# Patient Record
Sex: MALE
[De-identification: names, ages, dates, MRNs, and addresses within clinical notes are randomized per-mention and may not be internally consistent; named-entity substitution may affect disease eponyms.]

---

## 2019-03-05 PROBLEM — Z00.00 ENCOUNTER FOR PREVENTIVE HEALTH EXAMINATION: Status: ACTIVE | Noted: 2019-03-05

## 2019-03-06 ENCOUNTER — APPOINTMENT (OUTPATIENT)
Dept: OTOLARYNGOLOGY | Facility: CLINIC | Age: 54
End: 2019-03-06

## 2019-03-12 ENCOUNTER — APPOINTMENT (OUTPATIENT)
Dept: OTOLARYNGOLOGY | Facility: CLINIC | Age: 54
End: 2019-03-12

## 2019-03-14 ENCOUNTER — APPOINTMENT (OUTPATIENT)
Dept: OTOLARYNGOLOGY | Facility: CLINIC | Age: 54
End: 2019-03-14
Payer: SELF-PAY

## 2019-03-14 ENCOUNTER — TRANSCRIPTION ENCOUNTER (OUTPATIENT)
Age: 54
End: 2019-03-14

## 2019-03-14 PROCEDURE — V5299A: CUSTOM | Mod: NC

## 2019-03-14 PROCEDURE — 92557 COMPREHENSIVE HEARING TEST: CPT

## 2019-03-14 PROCEDURE — 92567 TYMPANOMETRY: CPT

## 2019-03-19 ENCOUNTER — APPOINTMENT (OUTPATIENT)
Dept: OTOLARYNGOLOGY | Facility: CLINIC | Age: 54
End: 2019-03-19
Payer: SELF-PAY

## 2019-03-19 PROCEDURE — V5299A: CUSTOM | Mod: NC

## 2019-04-22 ENCOUNTER — APPOINTMENT (OUTPATIENT)
Dept: OTOLARYNGOLOGY | Facility: CLINIC | Age: 54
End: 2019-04-22
Payer: COMMERCIAL

## 2019-04-22 VITALS
DIASTOLIC BLOOD PRESSURE: 78 MMHG | WEIGHT: 166 LBS | HEIGHT: 68 IN | BODY MASS INDEX: 25.16 KG/M2 | HEART RATE: 70 BPM | SYSTOLIC BLOOD PRESSURE: 131 MMHG

## 2019-04-22 DIAGNOSIS — Z91.09 OTHER ALLERGY STATUS, OTHER THAN TO DRUGS AND BIOLOGICAL SUBSTANCES: ICD-10-CM

## 2019-04-22 DIAGNOSIS — Z83.3 FAMILY HISTORY OF DIABETES MELLITUS: ICD-10-CM

## 2019-04-22 DIAGNOSIS — Z80.3 FAMILY HISTORY OF MALIGNANT NEOPLASM OF BREAST: ICD-10-CM

## 2019-04-22 DIAGNOSIS — H66.011 ACUTE SUPPURATIVE OTITIS MEDIA WITH SPONTANEOUS RUPTURE OF EAR DRUM, RIGHT EAR: ICD-10-CM

## 2019-04-22 DIAGNOSIS — Z78.9 OTHER SPECIFIED HEALTH STATUS: ICD-10-CM

## 2019-04-22 PROCEDURE — 99213 OFFICE O/P EST LOW 20 MIN: CPT | Mod: 25

## 2019-04-22 PROCEDURE — 69210 REMOVE IMPACTED EAR WAX UNI: CPT

## 2019-04-22 NOTE — HISTORY OF PRESENT ILLNESS
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 There is a history of tinnitus for many years since the 1990s. Previously treated with steroids with uncertain benefit. Intratympanic steroids appeared to have been effective.\par \par MRI 2000; normal MRI 2007: Normal\par CT temporal bone 2017: Normal\par \par RPR serologies in HSP 70 serologies 2017: Negative\par \par Right intratympanic steroid injections: March 2018: April 2018: June 2018: July 2018: October 2018: November 2018: December 2018;\par Treated with Humira\par \par July 2018: Right myringotomy with tube insertion\par \par Developed right otorrhea following air travel 5 days ago. Mild pain. Decline in hearing reported.

## 2019-04-22 NOTE — ASSESSMENT
[FreeTextEntry1] : Acute infection of the right ear was extruded ventilation tube and tympanic membrane perforation. Oral and topical antibiotics recommended. Hearing aid holiday recommended. Followup recommended.

## 2019-04-22 NOTE — REASON FOR VISIT
[Subsequent Evaluation] : a subsequent evaluation for [Hearing Loss] : hearing loss [Ear Drainage] : ear drainage

## 2019-04-22 NOTE — PHYSICAL EXAM
[FreeTextEntry1] : Procedure: Microscopic Ear Exam\par \par Left ear:  \par Ear canal intact without inflammation or lesion.  \par Tympanic membrane intact without inflammation.\par \par \par Right ear:  \par Obstructing cerumen and purulent debris removed with suction. The ventilation tube was previously extruded surrounded by purulent debris; and removed with alligator forceps. Inferior tympanic membrane perforation, 15% with mucoid discharge emanating. This was debrided gently with suction. Treated with topical CSF powder [Normal] : mucosa is normal [Midline] : trachea located in midline position

## 2019-05-06 ENCOUNTER — APPOINTMENT (OUTPATIENT)
Dept: OTOLARYNGOLOGY | Facility: CLINIC | Age: 54
End: 2019-05-06
Payer: COMMERCIAL

## 2019-05-06 VITALS
BODY MASS INDEX: 25.16 KG/M2 | DIASTOLIC BLOOD PRESSURE: 75 MMHG | WEIGHT: 166 LBS | HEART RATE: 71 BPM | HEIGHT: 68 IN | SYSTOLIC BLOOD PRESSURE: 122 MMHG

## 2019-05-06 PROCEDURE — 99214 OFFICE O/P EST MOD 30 MIN: CPT | Mod: 25

## 2019-05-06 PROCEDURE — 92504 EAR MICROSCOPY EXAMINATION: CPT

## 2019-05-06 RX ORDER — AMOXICILLIN AND CLAVULANATE POTASSIUM 875; 125 MG/1; MG/1
875-125 TABLET, COATED ORAL
Qty: 14 | Refills: 0 | Status: COMPLETED | COMMUNITY
Start: 2019-04-22 | End: 2019-05-06

## 2019-05-06 RX ORDER — CIPROFLOXACIN AND DEXAMETHASONE 3; 1 MG/ML; MG/ML
0.3-0.1 SUSPENSION/ DROPS AURICULAR (OTIC) TWICE DAILY
Qty: 1 | Refills: 0 | Status: DISCONTINUED | COMMUNITY
Start: 2019-04-22 | End: 2019-05-06

## 2019-05-06 NOTE — REVIEW OF SYSTEMS
I called pt back and reviewed labs and scheduled for f/up appointment at [facility] in November.  Eduin Yang RN [Patient Intake Form Reviewed] : Patient intake form was reviewed

## 2019-05-06 NOTE — HISTORY OF PRESENT ILLNESS
[FreeTextEntry1] : 05/06/2019 better symptoms.  Minor drainage remains in the right ear.  No pain.  Hearing is better but not back to baseline. Completed ora and topical antibiotics. 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 There is a history of tinnitus for many years since the 1990s. Previously treated with steroids with uncertain benefit. Intratympanic steroids appeared to have been effective.\par \par MRI 2000; normal MRI 2007: Normal\par CT temporal bone 2017: Normal\par \par RPR serologies in HSP 70 serologies 2017: Negative\par \par Right intratympanic steroid injections: March 2018: April 2018: June 2018: July 2018: October 2018: November 2018: December 2018;\par Treated with Humira\par \par July 2018: Right myringotomy with tube insertion\par

## 2019-05-06 NOTE — CONSULT LETTER
[Please see my note below.] : Please see my note below. [FreeTextEntry1] : Thank you for allowing me to participate in the care of MANOLO BUITRAGO .\par Please see the attached visit note.\par \par \par \par Terence Hurt\par Otology\par Department of Otolaryngology\par [facility]  [FreeTextEntry2] : Dear Dr. Kevan Driscoll

## 2019-05-06 NOTE — ASSESSMENT
[FreeTextEntry1] : Continued inflammation of the right ear canal. A culture was taken. Suspected fungal infection. Treated today with topical gentian violet and CSF antifungal powder. I have provided a prescription for antifungal powder and recommended followup in one week.\par \par After discussion with Dr. Driscoll, he was advised him to the next dose of Humira until this infection is clear. He will be seen in one week.

## 2019-05-06 NOTE — PHYSICAL EXAM
[Normal] : mucosa is normal [Midline] : trachea located in midline position [FreeTextEntry1] : Procedure: Microscopic Ear Exam\par \par Left ear:  \par Ear canal intact without inflammation or lesion.  \par Tympanic membrane intact without inflammation.\par \par \par Right ear:  \par Purulent and wet newspaper debris in the ear canal debrided with suction. A culture was taken prior to debridement. The tympanic membrane has a central perforation with no apparent inflammation associated. The middle ear mucosa appears to be uninvolved.\par \par This ear was treated with topical gentian violet and CSF powder

## 2019-05-08 ENCOUNTER — APPOINTMENT (OUTPATIENT)
Dept: OTOLARYNGOLOGY | Facility: CLINIC | Age: 54
End: 2019-05-08
Payer: COMMERCIAL

## 2019-05-08 PROCEDURE — V5299A: CUSTOM | Mod: NC

## 2019-05-09 ENCOUNTER — APPOINTMENT (OUTPATIENT)
Dept: ULTRASOUND IMAGING | Facility: HOSPITAL | Age: 54
End: 2019-05-09
Payer: COMMERCIAL

## 2019-05-09 ENCOUNTER — OUTPATIENT (OUTPATIENT)
Dept: OUTPATIENT SERVICES | Facility: HOSPITAL | Age: 54
LOS: 1 days | End: 2019-05-09
Payer: COMMERCIAL

## 2019-05-09 PROCEDURE — 76870 US EXAM SCROTUM: CPT

## 2019-05-09 PROCEDURE — 76870 US EXAM SCROTUM: CPT | Mod: 26

## 2019-05-10 ENCOUNTER — APPOINTMENT (OUTPATIENT)
Dept: OTOLARYNGOLOGY | Facility: CLINIC | Age: 54
End: 2019-05-10

## 2019-05-13 ENCOUNTER — APPOINTMENT (OUTPATIENT)
Dept: OTOLARYNGOLOGY | Facility: CLINIC | Age: 54
End: 2019-05-13
Payer: COMMERCIAL

## 2019-05-13 VITALS
BODY MASS INDEX: 25.16 KG/M2 | HEIGHT: 68 IN | DIASTOLIC BLOOD PRESSURE: 81 MMHG | SYSTOLIC BLOOD PRESSURE: 119 MMHG | WEIGHT: 166 LBS | HEART RATE: 62 BPM

## 2019-05-13 DIAGNOSIS — B36.9 SUPERFICIAL MYCOSIS, UNSPECIFIED: ICD-10-CM

## 2019-05-13 DIAGNOSIS — H62.41 SUPERFICIAL MYCOSIS, UNSPECIFIED: ICD-10-CM

## 2019-05-13 PROCEDURE — 92504 EAR MICROSCOPY EXAMINATION: CPT

## 2019-05-13 PROCEDURE — 99213 OFFICE O/P EST LOW 20 MIN: CPT | Mod: 25

## 2019-05-13 NOTE — PHYSICAL EXAM
[FreeTextEntry1] : Procedure: Microscopic Ear Exam\par \par Left ear:  \par Ear canal intact without inflammation or lesion.  \par Tympanic membrane intact without inflammation.\par \par \par Right ear:  \par Ear canal dry. Retained otic powder on the ear canal and tympanic membrane. No inflammation. A small tympanic membrane perforation patient remains inferiorly. 10-15% [Normal] : mucosa is normal [Midline] : trachea located in midline position

## 2019-05-13 NOTE — HISTORY OF PRESENT ILLNESS
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 There is a history of tinnitus for many years since the 1990s. Previously treated with steroids with uncertain benefit. Intratympanic steroids appeared to have been effective.\par \par MRI 2000; normal MRI 2007: Normal\par CT temporal bone 2017: Normal\par \par RPR serologies in HSP 70 serologies 2017: Negative\par \par Right intratympanic steroid injections: March 2018: April 2018: June 2018: July 2018: October 2018: November 2018: December 2018;\par Treated with Humira\par \par July 2018: Right myringotomy with tube insertion\par  [FreeTextEntry1] : 05/13/2019 dizzy and nausea yesterday after exertion, now better. No ear pain or otorrhea.  Used rx powder in the ear.  Hearing seems to be improved.Discontinued the use of Humira during the acute infection has per rheumatologist.

## 2019-05-13 NOTE — CONSULT LETTER
[FreeTextEntry2] : Dear Kevan Driscoll [FreeTextEntry1] : Thank you for allowing me to participate in the care of MANOLO BUITRAGO .\par Please see the attached visit note.\par \par \par \par Terence Hurt\par Otology\par Department of Otolaryngology\par [facility]

## 2019-05-13 NOTE — ASSESSMENT
[FreeTextEntry1] : Significantly improved infection of the right ear presumably due to fungus. Fungal culture remains pending.\par \par Small perforation remained to the tympanic membrane. I have recommended dry ear precautions and reduced hearing aide usage. This may require time off from work.\par \par Followup recommended in 4 weeks.May wish to return to prior dosing of Humira in approximately 2 weeks.

## 2019-05-14 LAB — BACTERIA SPEC CULT: NORMAL

## 2019-06-05 LAB — FUNGUS SPEC CULT ORG #8: NORMAL

## 2019-06-10 ENCOUNTER — APPOINTMENT (OUTPATIENT)
Dept: OTOLARYNGOLOGY | Facility: CLINIC | Age: 54
End: 2019-06-10
Payer: COMMERCIAL

## 2019-06-10 VITALS
WEIGHT: 166 LBS | BODY MASS INDEX: 25.16 KG/M2 | DIASTOLIC BLOOD PRESSURE: 78 MMHG | HEIGHT: 68 IN | HEART RATE: 79 BPM | SYSTOLIC BLOOD PRESSURE: 119 MMHG

## 2019-06-10 PROCEDURE — 92504 EAR MICROSCOPY EXAMINATION: CPT

## 2019-06-10 PROCEDURE — 99213 OFFICE O/P EST LOW 20 MIN: CPT

## 2019-06-10 RX ORDER — VORICONAZOLE 50 MG/1
50 TABLET ORAL
Qty: 5 | Refills: 0 | Status: DISCONTINUED | COMMUNITY
Start: 2019-05-06 | End: 2019-06-10

## 2019-06-10 NOTE — HISTORY OF PRESENT ILLNESS
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 There is a history of tinnitus for many years since the 1990s. Previously treated with steroids with uncertain benefit. Intratympanic steroids appeared to have been effective.\par \par MRI 2000; normal MRI 2007: Normal\par CT temporal bone 2017: Normal\par \par RPR serologies in HSP 70 serologies 2017: Negative\par \par Right intratympanic steroid injections: March 2018: April 2018: June 2018: July 2018: October 2018: November 2018: December 2018;\par Treated with Humira\par \par July 2018: Right myringotomy with tube insertion\par  [FreeTextEntry1] : 06/10/2019 Follow up ear infection (presumed to be fungal) and immune ear disease.  no ear pain or otorrhea.  Back on Humira.  No significant change in hearing.

## 2019-06-10 NOTE — ASSESSMENT
[FreeTextEntry1] : Complete resolution of ear canal inflammation with persistent tympanic membrane perforation present. Water protection recommended. Repeat audiometry recommended.\par \par We discussed management options including patch myringoplasty versus formal tympanoplasty versus observation. This was reviewed in detail. He appears to favor a patch procedure.

## 2019-06-10 NOTE — PHYSICAL EXAM
[FreeTextEntry1] : Procedure: Microscopic Ear Exam\par \par Left ear:  \par Ear canal intact without inflammation or lesion.  \par Tympanic membrane intact without inflammation.\par \par \par Right ear:  \par Mild residual gentian violet in the ear canal. No inflammation. Central tympanic membrane perforation 15%. No inflammation of the tympanic membrane or middle ear mucosa. [Normal] : palatine tonsils are normal [Midline] : trachea located in midline position

## 2019-06-21 ENCOUNTER — APPOINTMENT (OUTPATIENT)
Dept: OTOLARYNGOLOGY | Facility: CLINIC | Age: 54
End: 2019-06-21
Payer: COMMERCIAL

## 2019-06-21 PROCEDURE — 92557 COMPREHENSIVE HEARING TEST: CPT

## 2019-06-21 PROCEDURE — 92567 TYMPANOMETRY: CPT

## 2019-06-21 PROCEDURE — 92593: CPT | Mod: NC

## 2019-07-01 ENCOUNTER — APPOINTMENT (OUTPATIENT)
Dept: OTOLARYNGOLOGY | Facility: CLINIC | Age: 54
End: 2019-07-01
Payer: COMMERCIAL

## 2019-07-01 VITALS
HEIGHT: 68 IN | WEIGHT: 166 LBS | BODY MASS INDEX: 25.16 KG/M2 | DIASTOLIC BLOOD PRESSURE: 71 MMHG | SYSTOLIC BLOOD PRESSURE: 126 MMHG | HEART RATE: 70 BPM

## 2019-07-01 PROCEDURE — 99213 OFFICE O/P EST LOW 20 MIN: CPT | Mod: 25

## 2019-07-01 PROCEDURE — 92504 EAR MICROSCOPY EXAMINATION: CPT

## 2019-07-01 PROCEDURE — 69620 MYRINGOPLASTY: CPT

## 2019-07-01 NOTE — CONSULT LETTER
[Please see my note below.] : Please see my note below. [FreeTextEntry2] : Dear Kevan Driscoll [FreeTextEntry1] : Thank you for allowing me to participate in the care of your patient.\par Please see the attached visit note.\par \par \par \par Terence Hurt\par Otology\par Department of Otolaryngology\par [facility]\par

## 2019-07-01 NOTE — ASSESSMENT
[FreeTextEntry1] : Persistent tympanic membrane perforation in the right ear with hearing loss consequence. I have carefully reviewed the management options in detail including all risks limitations complications and alternatives. He wished to proceed with a patch procedure today.\par \par \par Significant improvement in hearing reported after placement of a patch repair on the right tympanic membrane. Wound instructions reviewed. Followup recommended in one month.

## 2019-07-01 NOTE — PROCEDURE
[FreeTextEntry3] : Procedure: Right Paper patch myringoplasty \par Management options reviewed in detail.  All risks limitations complications and alternatives reviewed with the patient who agreed.\par \par Anesthesia:topical phenol\par Complications: None\par Estimated Blood Loss: None\par Findings: central perforation, rimmed\par

## 2019-07-01 NOTE — PHYSICAL EXAM
[Normal] : mucosa is normal [Midline] : trachea located in midline position [FreeTextEntry1] : Procedure: Microscopic Ear Exam\par \par Left ear:  \par Ear canal intact without inflammation or lesion.  \par Tympanic membrane intact without inflammation.\par \par \par Right ear:  \par Mild residual gentian violet in the ear canal. No inflammation. Central tympanic membrane perforation 20%. No inflammation of the tympanic membrane or middle ear mucosa.

## 2019-07-01 NOTE — HISTORY OF PRESENT ILLNESS
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 There is a history of tinnitus for many years since the 1990s. Previously treated with steroids with uncertain benefit. Intratympanic steroids appeared to have been effective.\par \par MRI 2000; normal MRI 2007: Normal\par CT temporal bone 2017: Normal\par \par RPR serologies in HSP 70 serologies 2017: Negative\par \par Right intratympanic steroid injections: March 2018: April 2018: June 2018: July 2018: October 2018: November 2018: December 2018;\par Treated with Humira\par \par July 2018: Right myringotomy with tube insertion\par \par 06/10/2019 Follow up ear infection (presumed to be fungal) and immune ear disease.  no ear pain or otorrhea.  Back on Humira.  No significant change in hearing. [FreeTextEntry1] : 07/01/2019 Routine Follow up for the ear drainage. Wanted to talk about proceeding with the patch myringoplasty. No pain and otorrhea reported

## 2019-07-03 ENCOUNTER — APPOINTMENT (OUTPATIENT)
Dept: OTOLARYNGOLOGY | Facility: CLINIC | Age: 54
End: 2019-07-03
Payer: COMMERCIAL

## 2019-07-03 PROCEDURE — 92593: CPT | Mod: NC

## 2019-08-06 ENCOUNTER — APPOINTMENT (OUTPATIENT)
Dept: OTOLARYNGOLOGY | Facility: CLINIC | Age: 54
End: 2019-08-06
Payer: COMMERCIAL

## 2019-08-06 VITALS
DIASTOLIC BLOOD PRESSURE: 70 MMHG | SYSTOLIC BLOOD PRESSURE: 114 MMHG | HEART RATE: 59 BPM | HEIGHT: 68 IN | BODY MASS INDEX: 25.16 KG/M2 | WEIGHT: 166 LBS

## 2019-08-06 PROCEDURE — 69210 REMOVE IMPACTED EAR WAX UNI: CPT | Mod: 79

## 2019-08-06 PROCEDURE — 99213 OFFICE O/P EST LOW 20 MIN: CPT | Mod: 25

## 2019-09-10 ENCOUNTER — APPOINTMENT (OUTPATIENT)
Dept: OTOLARYNGOLOGY | Facility: CLINIC | Age: 54
End: 2019-09-10
Payer: COMMERCIAL

## 2019-09-10 VITALS
SYSTOLIC BLOOD PRESSURE: 111 MMHG | WEIGHT: 166 LBS | DIASTOLIC BLOOD PRESSURE: 71 MMHG | HEART RATE: 68 BPM | BODY MASS INDEX: 25.16 KG/M2 | HEIGHT: 68 IN

## 2019-09-10 DIAGNOSIS — H72.91 UNSPECIFIED PERFORATION OF TYMPANIC MEMBRANE, RIGHT EAR: ICD-10-CM

## 2019-09-10 PROCEDURE — 99213 OFFICE O/P EST LOW 20 MIN: CPT | Mod: 25

## 2019-09-10 PROCEDURE — 92504 EAR MICROSCOPY EXAMINATION: CPT

## 2019-09-10 NOTE — PHYSICAL EXAM
[Normal] : mucosa is normal [Midline] : trachea located in midline position [FreeTextEntry1] : Procedure: Microscopic Ear Exam with cerumen debridement\par \par Left ear:  \par Ear canal intact without inflammation or lesion.  \par Tympanic membrane intact without inflammation.\par \par \par Right ear:  \par Obstructing cerumen debrided with alligator forceps.  A Patch remains on the posterior portion of the tympanic membrane. It is slightly lifted with a intact portion of the tympanic membrane noted beneath. No inflammation or middle ear process detected.

## 2019-09-10 NOTE — CONSULT LETTER
[Please see my note below.] : Please see my note below. [FreeTextEntry1] : Thank you for allowing me to participate in the care of your patient.\par Please see the attached visit note.\par \par \par \par Terence Hurt\par Otology\par Department of Otolaryngology\par [facility]\par  [FreeTextEntry2] : Dear Dr Kevan Driscoll

## 2019-09-10 NOTE — HISTORY OF PRESENT ILLNESS
[FreeTextEntry1] : August 6, 2019: Followup for  hearing loss. Underwent right patch myringoplasty recently. Feels his hearing has significantly improved and has remained better. No ear pain or diarrhea reported. 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 This has caused fluctuating hearing loss. There is a history of tinnitus for many years since the 1990s. Previously treated with steroids with uncertain benefit. Intratympanic steroids appeared to have been effective.\par \par MRI 2000; normal MRI 2007: Normal\par CT temporal bone 2017: Normal\par \par RPR serologies in HSP 70 serologies 2017: Negative\par \par Right intratympanic steroid injections: March 2018: April 2018: June 2018: July 2018: October 2018: November 2018: December 2018;\par Treated with Humira\par \par July 2018: Right myringotomy with tube insertion\par July 2019: Right patch myringoplasty

## 2019-09-10 NOTE — DATA REVIEWED
[de-identified] : Complete audiometry was ordered and completed today. This was separately reported by the audiologist. The results were reviewed in detail with the patient.\par \par

## 2019-09-10 NOTE — PHYSICAL EXAM
[Normal] : mucosa is normal [Midline] : trachea located in midline position [FreeTextEntry1] : Procedure: Microscopic Ear Exam\par \par Left ear:  \par Ear canal intact without inflammation or lesion.  \par Tympanic membrane intact without inflammation.\par \par \par Right ear:  \par Extruded patch retrieved from the ear canal. Ear canal patent without inflammation. Tympanic membrane is intact and noninflamed.

## 2019-09-10 NOTE — CONSULT LETTER
[Please see my note below.] : Please see my note below. [FreeTextEntry2] : Dear Dr Kevan Driscoll [FreeTextEntry1] : Thank you for allowing me to participate in the care of your patient.\par Please see the attached visit note.\par \par \par \par Terence Hurt\par Otology\par Department of Otolaryngology\par [facility]\par

## 2019-09-10 NOTE — HISTORY OF PRESENT ILLNESS
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 There is a history of tinnitus for many years since the 1990s. Previously treated with steroids with uncertain benefit. Intratympanic steroids appeared to have been effective.\par \par MRI 2000; normal MRI 2007: Normal\par CT temporal bone 2017: Normal\par \par RPR serologies in HSP 70 serologies 2017: Negative\par \par Right intratympanic steroid injections: March 2018: April 2018: June 2018: July 2018: October 2018: November 2018: December 2018;\par Treated with Humira\par \par July 2018: Right myringotomy with tube insertion\par July 2019: Right patch myringoplasty [FreeTextEntry1] : 09/10/2019 Followup for  hearing loss. Feels his hearing has significantly improved and has remained better. No ear pain or otorrhea reported.\par Stopped Humira due to lack of perceived response.

## 2019-09-10 NOTE — ASSESSMENT
[FreeTextEntry1] : Good response to right tympanic membrane repair. Perceived improvement in hearing. Healing appears to be adequate but inconclusive at this time. I have recommended continued clinical monitoring with follow up in 6-8 weeks. Water protection\par \par

## 2019-09-10 NOTE — ASSESSMENT
[FreeTextEntry1] : Significantly improved hearing in the right ear after closure of the right tympanic membrane perforation. The patient is quite pleased.\par \par The patient has discontinued the use of Humira approximately 4 weeks ago with no apparent ill effect. I have recommended followup with the rheumatologist for further counseling and recommendation.\par \par Followup with audiometry recommended in 3 months.

## 2019-09-17 ENCOUNTER — APPOINTMENT (OUTPATIENT)
Dept: OTOLARYNGOLOGY | Facility: CLINIC | Age: 54
End: 2019-09-17
Payer: COMMERCIAL

## 2019-09-17 PROCEDURE — 92593: CPT | Mod: NC

## 2019-10-08 ENCOUNTER — APPOINTMENT (OUTPATIENT)
Dept: OTOLARYNGOLOGY | Facility: CLINIC | Age: 54
End: 2019-10-08
Payer: COMMERCIAL

## 2019-10-08 VITALS
SYSTOLIC BLOOD PRESSURE: 141 MMHG | WEIGHT: 166 LBS | HEART RATE: 69 BPM | BODY MASS INDEX: 25.16 KG/M2 | DIASTOLIC BLOOD PRESSURE: 69 MMHG | HEIGHT: 68 IN

## 2019-10-08 PROCEDURE — 92557 COMPREHENSIVE HEARING TEST: CPT

## 2019-10-08 PROCEDURE — 99213 OFFICE O/P EST LOW 20 MIN: CPT | Mod: 25

## 2019-10-08 PROCEDURE — 92504 EAR MICROSCOPY EXAMINATION: CPT

## 2019-10-08 PROCEDURE — 92567 TYMPANOMETRY: CPT

## 2019-10-08 NOTE — PHYSICAL EXAM
[FreeTextEntry1] : Procedure: Microscopic Ear Exam\par \par Left ear:  Ear canal intact without inflammation or lesion.  \par Tympanic membrane intact without inflammation.\par \par Right ear:  Ear canal intact without inflammation or lesion.  \par Tympanic membrane intact without inflammation.\par \par  [Normal] : temporomandibular joint is normal

## 2019-10-08 NOTE — DATA REVIEWED
[de-identified] : Complete audiometry was ordered and completed today. This was separately reported by the audiologist. The results were reviewed in detail with the patient.\par \par

## 2019-10-08 NOTE — ASSESSMENT
[FreeTextEntry1] : Stable hearing which has improved in the right ear. He continues on anti-inflammatory medication. I have recommended followup with rheumatology. I recommend continued clinical monitoring.

## 2019-10-08 NOTE — HISTORY OF PRESENT ILLNESS
[FreeTextEntry1] : 10/08/2019 Patient report no otorrhea. Patient reports stable hearing.  Restarted humira medication 2 weeks ago.  No hearing fluctuations or drops. Feels significantly improved hearing bilaterally. Using bilateral amplification. 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 There is a history of tinnitus for many years since the 1990s. Previously treated with steroids with uncertain benefit. Intratympanic steroids appeared to have been effective.\par \par MRI 2000; normal MRI 2007: Normal\par CT temporal bone 2017: Normal\par \par RPR serologies in HSP 70 serologies 2017: Negative\par \par Right intratympanic steroid injections: March 2018: April 2018: June 2018: July 2018: October 2018: November 2018: December 2018;\par Treated with Humira\par \par July 2018: Right myringotomy with tube insertion\par July 2019: Right patch myringoplasty \par

## 2019-12-02 ENCOUNTER — APPOINTMENT (OUTPATIENT)
Dept: OTOLARYNGOLOGY | Facility: CLINIC | Age: 54
End: 2019-12-02
Payer: COMMERCIAL

## 2019-12-02 VITALS
BODY MASS INDEX: 25.16 KG/M2 | HEART RATE: 62 BPM | WEIGHT: 166 LBS | HEIGHT: 68 IN | DIASTOLIC BLOOD PRESSURE: 76 MMHG | SYSTOLIC BLOOD PRESSURE: 122 MMHG

## 2019-12-02 PROCEDURE — 92567 TYMPANOMETRY: CPT

## 2019-12-02 PROCEDURE — 92557 COMPREHENSIVE HEARING TEST: CPT

## 2019-12-02 PROCEDURE — 99213 OFFICE O/P EST LOW 20 MIN: CPT

## 2019-12-02 NOTE — ASSESSMENT
[FreeTextEntry1] : Stable hearing and symptoms. Followup with rheumatology and audiology recommended.\par \par Followup in his office recommended in 3-4 months.

## 2019-12-02 NOTE — DATA REVIEWED
[de-identified] : Complete audiometry was ordered and completed today. This was separately reported by the audiologist. The results were reviewed in detail with the patient.\par \par Stable bilateral hearing loss

## 2019-12-02 NOTE — CONSULT LETTER
[Please see my note below.] : Please see my note below. [FreeTextEntry2] : Dear Dr Kevan Driscoll [FreeTextEntry1] : Thank you for participating in the care of MANOLO BUITRAGO .\par Please see the attached visit note.\par \par \par \par Terence Hurt\par Otology\par Department of Otolaryngology\par [facility]

## 2019-12-02 NOTE — HISTORY OF PRESENT ILLNESS
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 There is a history of tinnitus for many years since the 1990s. Previously treated with steroids with uncertain benefit. Intratympanic steroids appeared to have been effective.\par \par MRI 2000; normal MRI 2007: Normal\par CT temporal bone 2017: Normal\par \par RPR serologies in HSP 70 serologies 2017: Negative\par \par Right intratympanic steroid injections: March 2018: April 2018: June 2018: July 2018: October 2018: November 2018: December 2018;\par Treated with Humira\par \par July 2018: Right myringotomy with tube insertion\par July 2019: Right patch myringoplasty \par  [FreeTextEntry1] : 12/02/2019 \par Patient report no otorrhea. Patient reports stable hearing. Patient reports to still humira medication.  No hearing fluctuations or drops. Feels significantly improved hearing bilaterally. Using bilateral amplification.

## 2019-12-02 NOTE — PHYSICAL EXAM
[FreeTextEntry1] : Procedure: Microscopic Ear Exam\par \par Left ear:  Ear canal intact without inflammation or lesion.  \par Tympanic membrane intact without inflammation.\par \par Right ear:  Ear canal intact without inflammation or lesion.  \par Tympanic membrane intact without inflammation.\par \par  [Normal] : mucosa is normal [Midline] : trachea located in midline position

## 2019-12-03 ENCOUNTER — APPOINTMENT (OUTPATIENT)
Dept: OTOLARYNGOLOGY | Facility: CLINIC | Age: 54
End: 2019-12-03
Payer: SELF-PAY

## 2019-12-03 PROCEDURE — 92593: CPT | Mod: NC

## 2020-02-19 ENCOUNTER — APPOINTMENT (OUTPATIENT)
Dept: OTOLARYNGOLOGY | Facility: CLINIC | Age: 55
End: 2020-02-19
Payer: SELF-PAY

## 2020-02-19 PROCEDURE — 92593: CPT | Mod: NC

## 2020-04-08 ENCOUNTER — APPOINTMENT (OUTPATIENT)
Dept: OTOLARYNGOLOGY | Facility: CLINIC | Age: 55
End: 2020-04-08

## 2020-04-29 ENCOUNTER — APPOINTMENT (OUTPATIENT)
Dept: OTOLARYNGOLOGY | Facility: CLINIC | Age: 55
End: 2020-04-29

## 2020-07-07 ENCOUNTER — APPOINTMENT (OUTPATIENT)
Dept: OTOLARYNGOLOGY | Facility: CLINIC | Age: 55
End: 2020-07-07

## 2020-07-15 ENCOUNTER — APPOINTMENT (OUTPATIENT)
Dept: OTOLARYNGOLOGY | Facility: CLINIC | Age: 55
End: 2020-07-15
Payer: SELF-PAY

## 2020-07-15 PROCEDURE — 92593: CPT | Mod: NC

## 2020-08-03 ENCOUNTER — APPOINTMENT (OUTPATIENT)
Dept: OTOLARYNGOLOGY | Facility: CLINIC | Age: 55
End: 2020-08-03
Payer: COMMERCIAL

## 2020-08-03 VITALS — TEMPERATURE: 98.4 F | WEIGHT: 176 LBS | BODY MASS INDEX: 26.67 KG/M2 | HEIGHT: 68 IN

## 2020-08-03 PROCEDURE — 92504 EAR MICROSCOPY EXAMINATION: CPT

## 2020-08-03 PROCEDURE — 92567 TYMPANOMETRY: CPT

## 2020-08-03 PROCEDURE — 92557 COMPREHENSIVE HEARING TEST: CPT

## 2020-08-03 PROCEDURE — 92593: CPT | Mod: NC

## 2020-08-03 PROCEDURE — 99213 OFFICE O/P EST LOW 20 MIN: CPT | Mod: 25

## 2020-08-06 ENCOUNTER — APPOINTMENT (OUTPATIENT)
Dept: OTOLARYNGOLOGY | Facility: CLINIC | Age: 55
End: 2020-08-06
Payer: SELF-PAY

## 2020-08-06 PROCEDURE — 92593: CPT | Mod: NC

## 2020-08-26 ENCOUNTER — APPOINTMENT (OUTPATIENT)
Dept: OTOLARYNGOLOGY | Facility: CLINIC | Age: 55
End: 2020-08-26
Payer: COMMERCIAL

## 2020-08-26 ENCOUNTER — APPOINTMENT (OUTPATIENT)
Dept: OTOLARYNGOLOGY | Facility: CLINIC | Age: 55
End: 2020-08-26
Payer: SELF-PAY

## 2020-08-26 DIAGNOSIS — Z46.1 ENCOUNTER FOR FITTING AND ADJUSTMENT OF HEARING AID: ICD-10-CM

## 2020-08-26 PROCEDURE — V5275F: CUSTOM | Mod: 50

## 2020-08-26 PROCEDURE — V5275E: CUSTOM

## 2020-08-26 PROCEDURE — 92567 TYMPANOMETRY: CPT

## 2020-08-26 PROCEDURE — 92557 COMPREHENSIVE HEARING TEST: CPT

## 2020-08-31 NOTE — ASSESSMENT
[FreeTextEntry1] : Mostly stable bilateral sensorineural hearing loss. Currently not being treated with immune modulation. We discussed this including management options. I have recommended reevaluation by his audiologist for further amplification management. He has expressed interest in earlens technology.

## 2020-08-31 NOTE — PHYSICAL EXAM
[Normal] : no rashes [FreeTextEntry1] : Procedure: Microscopic Ear Exam\par \par Left ear:  Ear canal intact without inflammation or lesion.  \par Tympanic membrane intact without inflammation.\par \par Right ear:  Ear canal intact without inflammation or lesion.  \par Tympanic membrane intact without inflammation. inferior central monomere \par \par

## 2020-08-31 NOTE — HISTORY OF PRESENT ILLNESS
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 There is a history of tinnitus for many years since the 1990s. Previously treated with steroids with uncertain benefit. Intratympanic steroids appeared to have been effective.\par \par MRI 2000; normal MRI 2007: Normal\par CT temporal bone 2017: Normal\par \par RPR serologies in HSP 70 serologies 2017: Negative\par \par Right intratympanic steroid injections: March 2018: April 2018: June 2018: July 2018: October 2018: November 2018: December 2018;\par Treated with Humira\par \par July 2018: Right myringotomy with tube insertion\par July 2019: Right patch myringoplasty \par  [FreeTextEntry1] : 08/03/2020 \par Patient report no otorrhea. Patient reports stable hearing. Possibly slight worse in the right ear described as vibration.  Using bilateral amplification.  No sustained vertigo.  Stopped using Humira in early 2020.

## 2020-09-15 ENCOUNTER — APPOINTMENT (OUTPATIENT)
Dept: OTOLARYNGOLOGY | Facility: CLINIC | Age: 55
End: 2020-09-15
Payer: SELF-PAY

## 2020-09-15 PROCEDURE — V5275E: CUSTOM

## 2020-09-15 PROCEDURE — V5275E: CUSTOM | Mod: 50

## 2020-09-28 ENCOUNTER — APPOINTMENT (OUTPATIENT)
Dept: OTOLARYNGOLOGY | Facility: CLINIC | Age: 55
End: 2020-09-28
Payer: SELF-PAY

## 2020-09-28 PROCEDURE — 92593: CPT | Mod: NC

## 2020-10-13 ENCOUNTER — APPOINTMENT (OUTPATIENT)
Dept: OTOLARYNGOLOGY | Facility: CLINIC | Age: 55
End: 2020-10-13
Payer: SELF-PAY

## 2020-10-13 PROCEDURE — 92593: CPT | Mod: NC

## 2020-10-26 ENCOUNTER — APPOINTMENT (OUTPATIENT)
Dept: OTOLARYNGOLOGY | Facility: CLINIC | Age: 55
End: 2020-10-26

## 2020-11-02 ENCOUNTER — APPOINTMENT (OUTPATIENT)
Dept: OTOLARYNGOLOGY | Facility: CLINIC | Age: 55
End: 2020-11-02
Payer: SELF-PAY

## 2020-11-02 PROCEDURE — 92593: CPT | Mod: NC

## 2020-11-04 ENCOUNTER — APPOINTMENT (OUTPATIENT)
Dept: OTOLARYNGOLOGY | Facility: CLINIC | Age: 55
End: 2020-11-04
Payer: SELF-PAY

## 2020-11-04 PROCEDURE — 92593: CPT | Mod: NC

## 2020-11-04 PROCEDURE — V5275H: CUSTOM | Mod: RT

## 2020-11-10 ENCOUNTER — APPOINTMENT (OUTPATIENT)
Dept: OTOLARYNGOLOGY | Facility: CLINIC | Age: 55
End: 2020-11-10
Payer: SELF-PAY

## 2020-11-10 PROCEDURE — 92593: CPT | Mod: NC

## 2020-11-23 ENCOUNTER — APPOINTMENT (OUTPATIENT)
Dept: OTOLARYNGOLOGY | Facility: CLINIC | Age: 55
End: 2020-11-23
Payer: COMMERCIAL

## 2020-11-23 ENCOUNTER — APPOINTMENT (OUTPATIENT)
Dept: OTOLARYNGOLOGY | Facility: CLINIC | Age: 55
End: 2020-11-23
Payer: SELF-PAY

## 2020-11-23 PROCEDURE — 92593: CPT | Mod: NC

## 2020-11-23 PROCEDURE — 69210 REMOVE IMPACTED EAR WAX UNI: CPT

## 2020-11-23 PROCEDURE — 99214 OFFICE O/P EST MOD 30 MIN: CPT | Mod: 25

## 2020-11-23 NOTE — PROCEDURE
[FreeTextEntry3] : Procedure: Earlens Placement right ear\par \par Indication: Sensorineural Hearing loss \par \par Procedure: Under microscopic guidance the Right canal ear was cleaned of debris.  A small amount of oil was placed in the ear canal and on the tympanic membrane surface.  The ear lens device was grasped according to the 's specification and gently placed towards the tympanic membrane.  It was adjusted in place using a curved pick until it was well seated on the tympanic membrane surface.\par \par This procedure was tolerated well without significant discomfort.

## 2020-11-23 NOTE — PHYSICAL EXAM
[Normal] : temporomandibular joint is normal [FreeTextEntry1] : Microscopic ear exam with cerumen debridement:\par \par Right ear: Obstructing cerumen was debrided from the ear canal using suction, and curet.  The ear canal was otherwise within normal limits.  Earlens was noted to be significantly displaced from its usual position.\par Gently grasped and removed. The tympanic membrane was intact and noninflamed.\par \par Left ear: Obstructing cerumen was debrided from the ear canal using suction, and curets.  The ear canal was otherwise within normal limits.  The earlens was in good position and was not disturbed. The visible  tympanic membrane was intact and noninflamed.\par

## 2020-11-23 NOTE — HISTORY OF PRESENT ILLNESS
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 There is a history of tinnitus for many years since the 1990s. Previously treated with steroids with uncertain benefit. Intratympanic steroids appeared to have been effective.\par \par MRI 2000; normal MRI 2007: Normal\par CT temporal bone 2017: Normal\par \par RPR serologies in HSP 70 serologies 2017: Negative\par \par Right intratympanic steroid injections: March 2018: April 2018: June 2018: July 2018: October 2018: November 2018: December 2018;\par Treated with Humira\par \par July 2018: Right myringotomy with tube insertion\par July 2019: Right patch myringoplasty \par  [FreeTextEntry1] : 11/23/2020 \par  Recently identified with reduced hearing. This appears to be a sensorineural drop in both ears. He has recently started on oral steroids with some improvement reported. No ear pain reported. Mild dizziness brief vertigo possible. Tinnitus process.   Stopped using Humira in early 2020.

## 2020-11-23 NOTE — ASSESSMENT
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 He appears to be responding to oral steroids. I recommended completing his oral dose and have referred him back to his rheumatologist.\par \par Many replacement of right earlens

## 2020-12-02 ENCOUNTER — APPOINTMENT (OUTPATIENT)
Dept: OTOLARYNGOLOGY | Facility: CLINIC | Age: 55
End: 2020-12-02

## 2020-12-07 ENCOUNTER — APPOINTMENT (OUTPATIENT)
Dept: OTOLARYNGOLOGY | Facility: CLINIC | Age: 55
End: 2020-12-07

## 2020-12-15 ENCOUNTER — APPOINTMENT (OUTPATIENT)
Dept: OTOLARYNGOLOGY | Facility: CLINIC | Age: 55
End: 2020-12-15
Payer: COMMERCIAL

## 2020-12-15 PROCEDURE — 99214 OFFICE O/P EST MOD 30 MIN: CPT | Mod: 25

## 2020-12-15 PROCEDURE — 99072 ADDL SUPL MATRL&STAF TM PHE: CPT

## 2020-12-15 PROCEDURE — 92557 COMPREHENSIVE HEARING TEST: CPT

## 2020-12-15 PROCEDURE — 92567 TYMPANOMETRY: CPT

## 2020-12-15 NOTE — ASSESSMENT
[FreeTextEntry1] : Significantly improved hearing following treatment with steroids. Limitation of speech recognition remains but short tone thresholds had significantly improved. I discussed this with the patient in detail.\par \par Given the mild inflammation of the ear canals I have recommended a rest period until inflammation resolves in the ear canals before replacing the earlens device. I have recommended otic drops and followup in one week.

## 2020-12-15 NOTE — PHYSICAL EXAM
[Normal] : temporomandibular joint is normal [FreeTextEntry1] : Microscopic ear exam with cerumen debridement:\par \par Right ear: Obstructing cerumen was debrided from the ear canal using suction, and curet.  The ear canal was otherwise within normal limits.  Earlens device was displaced from proper position close to the tympanic membrane. The visible portion of the tympanic membrane was intact and noninflamed.\par \par \par Left ear: Obstructing cerumen was debrided from the ear canal using suction, and curets.  The ear canal was otherwise within normal limits.  Earlens device displaced from proper position close to the tympanic membrane. The visible portion of the tympanic membrane was intact and noninflamed.\par \par The earlens device was removed from each ear after which the deep ear canal was cleaned with a combination of oil soaked cotton swabs and suctioned. mild inflammation is noted bilaterally. Audiometry was obtained with lenses removed.

## 2020-12-15 NOTE — HISTORY OF PRESENT ILLNESS
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 There is a history of tinnitus for many years since the 1990s. Previously treated with steroids with uncertain benefit. Intratympanic steroids appeared to have been effective.\par \par MRI 2000; normal MRI 2007: Normal\par CT temporal bone 2017: Normal\par \par RPR serologies in HSP 70 serologies 2017: Negative\par \par Right intratympanic steroid injections: March 2018: April 2018: June 2018: July 2018: October 2018: November 2018: December 2018;\par Treated with Humira, but discontinued in early 2020\par \par July 2018: Right myringotomy with tube insertion\par July 2019: Right patch myringoplasty \par  [FreeTextEntry1] : December 15, 2020:\par recently treated for sudden hearing loss affecting both ears. He completed a course of oral steroids.He reports improvement in hearing. He feels that his earlens device may have stopped working in the right ear and has returned to using standard amplification device. Itching reported. No pain. No vertigo.\par

## 2020-12-15 NOTE — DATA REVIEWED
[de-identified] : Complete audiometry was ordered and completed today. This was separately reported by the audiologist. The results were reviewed in detail with the patient.\par \par audiometry testing was done today after earlens removal

## 2020-12-16 ENCOUNTER — NON-APPOINTMENT (OUTPATIENT)
Age: 55
End: 2020-12-16

## 2021-01-20 ENCOUNTER — APPOINTMENT (OUTPATIENT)
Dept: OTOLARYNGOLOGY | Facility: CLINIC | Age: 56
End: 2021-01-20
Payer: SELF-PAY

## 2021-01-20 PROCEDURE — 92593: CPT | Mod: NC

## 2021-01-20 PROCEDURE — V5275E: CUSTOM | Mod: NC,50

## 2021-01-26 ENCOUNTER — APPOINTMENT (OUTPATIENT)
Dept: OTOLARYNGOLOGY | Facility: CLINIC | Age: 56
End: 2021-01-26
Payer: SELF-PAY

## 2021-01-26 PROCEDURE — 92593: CPT | Mod: NC

## 2021-02-01 ENCOUNTER — APPOINTMENT (OUTPATIENT)
Dept: OTOLARYNGOLOGY | Facility: CLINIC | Age: 56
End: 2021-02-01

## 2021-02-03 ENCOUNTER — APPOINTMENT (OUTPATIENT)
Dept: OTOLARYNGOLOGY | Facility: CLINIC | Age: 56
End: 2021-02-03
Payer: SELF-PAY

## 2021-02-03 PROCEDURE — V5275E: CUSTOM | Mod: NC

## 2021-02-03 PROCEDURE — 92593: CPT | Mod: NC

## 2021-02-16 ENCOUNTER — NON-APPOINTMENT (OUTPATIENT)
Age: 56
End: 2021-02-16

## 2021-02-24 ENCOUNTER — APPOINTMENT (OUTPATIENT)
Dept: OTOLARYNGOLOGY | Facility: CLINIC | Age: 56
End: 2021-02-24
Payer: SELF-PAY

## 2021-02-24 ENCOUNTER — APPOINTMENT (OUTPATIENT)
Dept: OTOLARYNGOLOGY | Facility: CLINIC | Age: 56
End: 2021-02-24
Payer: COMMERCIAL

## 2021-02-24 PROCEDURE — 92593: CPT | Mod: NC

## 2021-02-24 PROCEDURE — 99024 POSTOP FOLLOW-UP VISIT: CPT

## 2021-04-01 ENCOUNTER — NON-APPOINTMENT (OUTPATIENT)
Age: 56
End: 2021-04-01

## 2021-04-06 ENCOUNTER — APPOINTMENT (OUTPATIENT)
Dept: OTOLARYNGOLOGY | Facility: CLINIC | Age: 56
End: 2021-04-06
Payer: COMMERCIAL

## 2021-04-06 ENCOUNTER — APPOINTMENT (OUTPATIENT)
Dept: OTOLARYNGOLOGY | Facility: CLINIC | Age: 56
End: 2021-04-06
Payer: SELF-PAY

## 2021-04-06 PROCEDURE — 92593: CPT | Mod: NC

## 2021-04-06 PROCEDURE — 99072 ADDL SUPL MATRL&STAF TM PHE: CPT

## 2021-04-06 PROCEDURE — 99213 OFFICE O/P EST LOW 20 MIN: CPT | Mod: 25

## 2021-04-06 PROCEDURE — 99024 POSTOP FOLLOW-UP VISIT: CPT

## 2021-04-06 PROCEDURE — G0268 REMOVAL OF IMPACTED WAX MD: CPT

## 2021-04-06 RX ORDER — ADALIMUMAB 40MG/0.4ML
40 KIT SUBCUTANEOUS
Qty: 2 | Refills: 0 | Status: DISCONTINUED | COMMUNITY
Start: 2019-04-09 | End: 2021-04-06

## 2021-04-06 RX ORDER — PREDNISONE 10 MG/1
10 TABLET ORAL
Qty: 42 | Refills: 0 | Status: DISCONTINUED | COMMUNITY
Start: 2020-11-12 | End: 2021-04-06

## 2021-04-06 RX ORDER — LEVOFLOXACIN 500 MG/1
500 TABLET, FILM COATED ORAL
Qty: 10 | Refills: 0 | Status: DISCONTINUED | COMMUNITY
Start: 2019-04-26 | End: 2021-04-06

## 2021-04-06 NOTE — HISTORY OF PRESENT ILLNESS
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 There is a history of tinnitus for many years since the 1990s. Previously treated with steroids with uncertain benefit. Intratympanic steroids appeared to have been effective.\par \par MRI 2000; normal MRI 2007: Normal\par CT temporal bone 2017: Normal\par \par RPR serologies in HSP 70 serologies 2017: Negative\par \par Right intratympanic steroid injections: March 2018: April 2018: June 2018: July 2018: October 2018: November 2018: December 2018;\par Treated with Humira, but discontinued in early 2020\par \par July 2018: Right myringotomy with tube insertion\par July 2019: Right patch myringoplasty \par  [FreeTextEntry1] : 04/06/2021 \par Presents with several day history of decreasing hearing in the right ear. No pain or otorrhea. Reported. No vertigo. Using earlens device with limited success. Using otic drops.\par

## 2021-04-06 NOTE — ASSESSMENT
[FreeTextEntry1] : Impression / Plan:\par \par Recurrent cerumen retention, possibly secondary to inflammatory changes of the ear canal. I have reviewed ear, hygiene, and detail. I have recommended a trial with steroid-containing otic oil. Followup recommended in 6 weeks.\par \par Followup with audiologist for hearing device adjustment as needed.

## 2021-04-06 NOTE — PHYSICAL EXAM
[Normal] : temporomandibular joint is normal [FreeTextEntry1] : Microscopic ear exam with cerumen debridement:\par \par Right ear: Obstructing cerumen was debrided from the ear canal using suction, and curet.  The ear canal was within normal limits.  Earlens device appears to be displaced. It was removed and replaced. The tympanic membrane appears within normal limits.\par \par Left ear: Obstructing cerumen was debrided from the ear canal using suction, and curets.  The ear canal was within normal limits.  Earlens device in proper position close to the tympanic membrane. The visible portion of the tympanic membrane was intact and noninflamed.\par \par

## 2021-05-13 ENCOUNTER — APPOINTMENT (OUTPATIENT)
Dept: OTOLARYNGOLOGY | Facility: CLINIC | Age: 56
End: 2021-05-13
Payer: SELF-PAY

## 2021-05-13 ENCOUNTER — APPOINTMENT (OUTPATIENT)
Dept: OTOLARYNGOLOGY | Facility: CLINIC | Age: 56
End: 2021-05-13
Payer: COMMERCIAL

## 2021-05-13 PROCEDURE — 99213 OFFICE O/P EST LOW 20 MIN: CPT | Mod: 25

## 2021-05-13 PROCEDURE — 92593: CPT | Mod: NC

## 2021-05-13 PROCEDURE — 99072 ADDL SUPL MATRL&STAF TM PHE: CPT

## 2021-05-13 PROCEDURE — 69210 REMOVE IMPACTED EAR WAX UNI: CPT

## 2021-05-13 NOTE — ASSESSMENT
[FreeTextEntry1] : Ear hygiene reviewed. Continued use of alternating otic drops with steroid-containing drops and mineral oil.\par \par Followup recommended in 6 weeks. There may be a need to remove the earlens device for thorough cleaning of the deep portions of the ear canal.\par \par

## 2021-05-13 NOTE — PHYSICAL EXAM
[Normal] : temporomandibular joint is normal [FreeTextEntry1] : Microscopic ear exam with cerumen debridement:\par \par Right ear: Obstructing cerumen was debrided from the ear canal using suction, and curet.  The ear canal was within normal limits.  Earlens device in proper position close to the tympanic membrane. The visible portion of the tympanic membrane was intact and noninflamed.\par \par \par Left ear: Obstructing cerumen was debrided from the ear canal using suction, and curets.  The ear canal was within normal limits.  Earlens device in proper position close to the tympanic membrane. The visible portion of the tympanic membrane was intact and noninflamed.\par \par

## 2021-05-13 NOTE — HISTORY OF PRESENT ILLNESS
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 There is a history of tinnitus for many years since the 1990s. Previously treated with steroids with uncertain benefit. Intratympanic steroids appeared to have been effective.\par \par MRI 2000; normal MRI 2007: Normal\par CT temporal bone 2017: Normal\par \par RPR serologies in HSP 70 serologies 2017: Negative\par \par Right intratympanic steroid injections: March 2018: April 2018: June 2018: July 2018: October 2018: November 2018: December 2018;\par Treated with Humira, but discontinued in early 2020\par \par July 2018: Right myringotomy with tube insertion\par July 2019: Right patch myringoplasty \par  [FreeTextEntry1] : 05/13/2021 \par Feedback noted with hearing device.  Refrerred by audiologist for cerument.  No change in hearing reported.  compliant with otic drops

## 2021-05-19 ENCOUNTER — APPOINTMENT (OUTPATIENT)
Dept: OTOLARYNGOLOGY | Facility: CLINIC | Age: 56
End: 2021-05-19
Payer: SELF-PAY

## 2021-05-19 PROCEDURE — 92593: CPT | Mod: NC

## 2021-06-01 ENCOUNTER — APPOINTMENT (OUTPATIENT)
Dept: OTOLARYNGOLOGY | Facility: CLINIC | Age: 56
End: 2021-06-01
Payer: SELF-PAY

## 2021-06-01 ENCOUNTER — APPOINTMENT (OUTPATIENT)
Dept: OTOLARYNGOLOGY | Facility: CLINIC | Age: 56
End: 2021-06-01
Payer: COMMERCIAL

## 2021-06-01 DIAGNOSIS — H83.8X3 OTHER SPECIFIED DISEASES OF INNER EAR, BILATERAL: ICD-10-CM

## 2021-06-01 PROCEDURE — 99072 ADDL SUPL MATRL&STAF TM PHE: CPT

## 2021-06-01 PROCEDURE — 92557 COMPREHENSIVE HEARING TEST: CPT

## 2021-06-01 PROCEDURE — G0268 REMOVAL OF IMPACTED WAX MD: CPT

## 2021-06-01 PROCEDURE — 92567 TYMPANOMETRY: CPT

## 2021-06-01 PROCEDURE — 99024 POSTOP FOLLOW-UP VISIT: CPT

## 2021-06-01 PROCEDURE — 99213 OFFICE O/P EST LOW 20 MIN: CPT | Mod: 25

## 2021-06-01 PROCEDURE — 92593: CPT | Mod: NC

## 2021-06-01 NOTE — PHYSICAL EXAM
[Normal] : temporomandibular joint is normal [FreeTextEntry1] : Microscopic ear exam with cerumen debridement:\par Dear lenses were removed from both ears. Prior to debridement. A deep debridement was performed.\par \par \par Right ear: Obstructing cerumen was debrided from the ear canal using suction, and curet.  The ear canal was otherwise within normal limits.  The tympanic membrane was intact and noninflamed.\par \par Left ear: Obstructing cerumen was debrided from the ear canal using suction, and curets.  The ear canal was otherwise within normal limits.  The tympanic membrane was intact and noninflamed.\par

## 2021-06-01 NOTE — ASSESSMENT
[FreeTextEntry1] : Frequently recurrent cerumen retention, likely related to low-grade inflammation. I have discussed this with the patient and have reviewed ear hygiene in detail.\par \par I have recommended continued use of anti-inflammatory otic drops, as well as mineral oil.\par \par Followup recommended in 3 weeks.

## 2021-06-01 NOTE — DATA REVIEWED
[de-identified] : Complete audiometry was ordered and completed today. I have interpreted these results and reviewed them in detail with the patient.\par \par this testing was done after lens removal.

## 2021-06-01 NOTE — HISTORY OF PRESENT ILLNESS
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 There is a history of tinnitus for many years since the 1990s. Previously treated with steroids with uncertain benefit. Intratympanic steroids appeared to have been effective.\par \par MRI 2000; normal MRI 2007: Normal\par CT temporal bone 2017: Normal\par \par RPR serologies in HSP 70 serologies 2017: Negative\par \par Right intratympanic steroid injections: March 2018: April 2018: June 2018: July 2018: October 2018: November 2018: December 2018;\par Treated with Humira, but discontinued in early 2020\par \par July 2018: Right myringotomy with tube insertion\par July 2019: Right patch myringoplasty \par  [FreeTextEntry1] : June 1, 2021:\par \par Recurrent fullness and hearing loss, reported bilaterally. No pain or otorrhea. Compliant with the use of otic drops.

## 2021-06-21 ENCOUNTER — APPOINTMENT (OUTPATIENT)
Dept: OTOLARYNGOLOGY | Facility: CLINIC | Age: 56
End: 2021-06-21

## 2021-06-28 ENCOUNTER — APPOINTMENT (OUTPATIENT)
Dept: OTOLARYNGOLOGY | Facility: CLINIC | Age: 56
End: 2021-06-28
Payer: SELF-PAY

## 2021-06-28 PROCEDURE — 92593: CPT | Mod: NC

## 2021-06-28 PROCEDURE — V5275H: CUSTOM

## 2021-07-26 ENCOUNTER — APPOINTMENT (OUTPATIENT)
Dept: OTOLARYNGOLOGY | Facility: CLINIC | Age: 56
End: 2021-07-26
Payer: COMMERCIAL

## 2021-07-26 DIAGNOSIS — H60.312 DIFFUSE OTITIS EXTERNA, LEFT EAR: ICD-10-CM

## 2021-07-26 PROCEDURE — 99213 OFFICE O/P EST LOW 20 MIN: CPT | Mod: 25

## 2021-07-26 PROCEDURE — 99072 ADDL SUPL MATRL&STAF TM PHE: CPT

## 2021-07-26 PROCEDURE — 69210 REMOVE IMPACTED EAR WAX UNI: CPT

## 2021-07-26 NOTE — HISTORY OF PRESENT ILLNESS
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 There is a history of tinnitus for many years since the 1990s. Previously treated with steroids with uncertain benefit. Intratympanic steroids appeared to have been effective.\par \par MRI 2000; normal MRI 2007: Normal\par CT temporal bone 2017: Normal\par \par RPR serologies in HSP 70 serologies 2017: Negative\par \par Right intratympanic steroid injections: March 2018: April 2018: June 2018: July 2018: October 2018: November 2018: December 2018;\par Treated with Humira, but discontinued in early 2020\par \par July 2018: Right myringotomy with tube insertion\par July 2019: Right patch myringoplasty \par  [FreeTextEntry1] : 07/26/2021 \par Mild fullness and discomfort in the ears reported. NO otorrhea. Compliant with the use of otic drops.

## 2021-07-26 NOTE — ASSESSMENT
[FreeTextEntry1] : Ear hygiene, reviewed. Daily use of steroid-containing otic drops recommended b.i.d. Followup recommended in 3 weeks

## 2021-07-26 NOTE — PHYSICAL EXAM
[Normal] : temporomandibular joint is normal [FreeTextEntry1] : Microscopic ear exam with cerumen debridement:\par \par Mild ear canal inflammation noted bilaterally with exudate, debrided with suction\par \par Right ear: Obstructing cerumen was debrided from the ear canal using suction, and curet.  The ear canal was within normal limits.  Earlens device slightly displaced. It was gently notched into proper position close to the tympanic membrane. The visible portion of the tympanic membrane was intact and noninflamed.\par \par \par Left ear: Obstructing cerumen was debrided from the ear canal using suction, and curets.  The ear canal was within normal limits.  Earlens device in proper position close to the tympanic membrane. The visible portion of the tympanic membrane was intact and noninflamed.\par \par

## 2021-08-16 ENCOUNTER — APPOINTMENT (OUTPATIENT)
Dept: OTOLARYNGOLOGY | Facility: CLINIC | Age: 56
End: 2021-08-16
Payer: COMMERCIAL

## 2021-08-16 VITALS — TEMPERATURE: 97.2 F | BODY MASS INDEX: 26.67 KG/M2 | WEIGHT: 176 LBS | HEIGHT: 68 IN

## 2021-08-16 PROCEDURE — 92504 EAR MICROSCOPY EXAMINATION: CPT

## 2021-08-16 PROCEDURE — 99213 OFFICE O/P EST LOW 20 MIN: CPT | Mod: 25

## 2021-08-16 NOTE — PHYSICAL EXAM
[Normal] : temporomandibular joint is normal [FreeTextEntry1] : Microscopic ear exam with cerumen debridement:\par \par Right ear: Obstructing cerumen was debrided from the ear canal using suction, and curet.  The ear canal was within normal limits.  Earlens device was slightly displaced and was gently repositioned into proper position close to the tympanic membrane. The visible portion of the tympanic membrane was intact and noninflamed.\par \par \par Left ear: Obstructing cerumen was debrided from the ear canal using suction, and curets.  The ear canal was within normal limits.  Earlens device in proper position close to the tympanic membrane. The visible portion of the tympanic membrane was intact and noninflamed.\par \par

## 2021-08-16 NOTE — HISTORY OF PRESENT ILLNESS
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 There is a history of tinnitus for many years since the 1990s. Previously treated with steroids with uncertain benefit. Intratympanic steroids appeared to have been effective.\par \par MRI 2000; normal MRI 2007: Normal\par CT temporal bone 2017: Normal\par \par RPR serologies in HSP 70 serologies 2017: Negative\par \par Right intratympanic steroid injections: March 2018: April 2018: June 2018: July 2018: October 2018: November 2018: December 2018;\par Treated with Humira, but discontinued in early 2020\par \par July 2018: Right myringotomy with tube insertion\par July 2019: Right patch myringoplasty \par  [FreeTextEntry1] : 08/16/2021 \par No pain or otorrhea.  No itching;. Hearing unchanged. \par \par

## 2021-08-16 NOTE — ASSESSMENT
[FreeTextEntry1] : Slight displacement of the right lens greater than the left. Due to a dry ear canal condition. Chronic inflammation of the ear canals appears to have improved bilaterally.\par \par I have increased the dosage of otic drops to twice daily and have recommended early followup.

## 2021-09-08 ENCOUNTER — APPOINTMENT (OUTPATIENT)
Dept: OTOLARYNGOLOGY | Facility: CLINIC | Age: 56
End: 2021-09-08
Payer: SELF-PAY

## 2021-09-08 ENCOUNTER — APPOINTMENT (OUTPATIENT)
Dept: OTOLARYNGOLOGY | Facility: CLINIC | Age: 56
End: 2021-09-08
Payer: COMMERCIAL

## 2021-09-08 PROCEDURE — 99213 OFFICE O/P EST LOW 20 MIN: CPT | Mod: 25

## 2021-09-08 PROCEDURE — 92593: CPT | Mod: NC

## 2021-09-08 NOTE — ASSESSMENT
[FreeTextEntry1] : cerumen and slight displacement of earlens noted.  Will follow with audiologist for further assessment of heairng loss and device setting.\par \par Follow up in 4 weeks. continue otic drops.

## 2021-09-08 NOTE — PHYSICAL EXAM
[Normal] : temporomandibular joint is normal [FreeTextEntry1] : Microscopic ear exam with cerumen debridement:\par \par Right ear: Obstructing cerumen was debrided from the ear canal using suction, and curet.  The ear canal was within normal limits.  Earlens device very slightly displaced, moved into proper position close to the tympanic membrane. The visible portion of the tympanic membrane was intact and noninflamed.\par \par \par Left ear: Obstructing cerumen was debrided from the ear canal using suction, and curets.  The ear canal was within normal limits.  Earlens device in proper position close to the tympanic membrane. The visible portion of the tympanic membrane was intact and noninflamed.\par \par

## 2021-09-08 NOTE — HISTORY OF PRESENT ILLNESS
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 There is a history of tinnitus for many years since the 1990s. Previously treated with steroids with uncertain benefit. Intratympanic steroids appeared to have been effective.\par \par MRI 2000; normal MRI 2007: Normal\par CT temporal bone 2017: Normal\par \par RPR serologies in HSP 70 serologies 2017: Negative\par \par Right intratympanic steroid injections: March 2018: April 2018: June 2018: July 2018: October 2018: November 2018: December 2018;\par Treated with Humira, but discontinued in early 2020\par \par July 2018: Right myringotomy with tube insertion\par July 2019: Right patch myringoplasty \par  [FreeTextEntry1] : 09/08/2021 \par few week history of hearing loss of uncertain laterality.  Mild dizziness/imbalance and vertigo. No ear discomfort.

## 2021-10-05 ENCOUNTER — APPOINTMENT (OUTPATIENT)
Dept: OTOLARYNGOLOGY | Facility: CLINIC | Age: 56
End: 2021-10-05
Payer: SELF-PAY

## 2021-10-05 ENCOUNTER — APPOINTMENT (OUTPATIENT)
Dept: OTOLARYNGOLOGY | Facility: CLINIC | Age: 56
End: 2021-10-05
Payer: COMMERCIAL

## 2021-10-05 PROCEDURE — ZZZZZ: CPT

## 2021-10-05 PROCEDURE — 92593: CPT | Mod: NC

## 2021-10-05 NOTE — PHYSICAL EXAM
[FreeTextEntry1] : Microscopic ear exam:\par \par Right ear:  The ear canal was within normal limits.  Earlens device appears to be slightly displaced, it was notched into proper position close to the tympanic membrane. The visible portion of the tympanic membrane was intact and noninflamed.\par \par \par Left ear: The ear canal was within normal limits.  Earlens device in proper position close to the tympanic membrane. The visible portion of the tympanic membrane was intact and noninflamed. \par \par \par At the patient's request, we removed the earlens is to prepare for a second impression of the ear canals for refitting of the earlens device.\par \par \par Procedure: Earlens removal\par \par Indication: Sensorineural Hearing loss \par \par Management options reviewed in detail.  All risks limitations complications and alternatives reviewed with the patient who agreed.\par \par The ear lens was removed from each ear by gently elevating the lens with a right angled instrument and retrieving the lens with an alligator on the grasping tab.  The TM was intact and non traumatized.

## 2021-10-05 NOTE — HISTORY OF PRESENT ILLNESS
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 There is a history of tinnitus for many years since the 1990s. Previously treated with steroids with uncertain benefit. Intratympanic steroids appeared to have been effective.\par \par MRI 2000; normal MRI 2007: Normal\par CT temporal bone 2017: Normal\par \par RPR serologies in HSP 70 serologies 2017: Negative\par \par Right intratympanic steroid injections: March 2018: April 2018: June 2018: July 2018: October 2018: November 2018: December 2018;\par Treated with Humira, but discontinued in early 2020\par \par July 2018: Right myringotomy with tube insertion\par July 2019: Right patch myringoplasty \par  [FreeTextEntry1] : 10/05/2021 \par Possible decline in hearing in the right ear for a few weeks  No ear pain.  Mild dizziness/imbalance and vertigo. No ear discomfort.

## 2021-10-05 NOTE — ASSESSMENT
[FreeTextEntry1] : Earlens device is removed from both ears, today. We discussed a plan for repeat ear canal impressions to obtain a better fit for the lens device. This was discussed with the audiology team

## 2021-11-02 ENCOUNTER — APPOINTMENT (OUTPATIENT)
Dept: OTOLARYNGOLOGY | Facility: CLINIC | Age: 56
End: 2021-11-02
Payer: SELF-PAY

## 2021-11-02 PROCEDURE — V5275E: CUSTOM | Mod: NC

## 2021-11-02 PROCEDURE — 92593: CPT | Mod: NC

## 2021-11-09 ENCOUNTER — APPOINTMENT (OUTPATIENT)
Dept: OTOLARYNGOLOGY | Facility: CLINIC | Age: 56
End: 2021-11-09
Payer: SELF-PAY

## 2021-11-09 PROCEDURE — 92593: CPT | Mod: NC

## 2021-11-30 ENCOUNTER — APPOINTMENT (OUTPATIENT)
Dept: OTOLARYNGOLOGY | Facility: CLINIC | Age: 56
End: 2021-11-30
Payer: SELF-PAY

## 2021-11-30 PROCEDURE — V5275H: CUSTOM

## 2021-11-30 PROCEDURE — 92593: CPT | Mod: NC

## 2021-12-13 ENCOUNTER — APPOINTMENT (OUTPATIENT)
Dept: OTOLARYNGOLOGY | Facility: CLINIC | Age: 56
End: 2021-12-13
Payer: COMMERCIAL

## 2021-12-13 DIAGNOSIS — H90.6 MIXED CONDUCTIVE AND SENSORINEURAL HEARING LOSS, BILATERAL: ICD-10-CM

## 2021-12-13 DIAGNOSIS — H60.311 DIFFUSE OTITIS EXTERNA, RIGHT EAR: ICD-10-CM

## 2021-12-13 PROCEDURE — 99214 OFFICE O/P EST MOD 30 MIN: CPT | Mod: 25

## 2021-12-13 PROCEDURE — 69210 REMOVE IMPACTED EAR WAX UNI: CPT

## 2021-12-13 NOTE — ASSESSMENT
[FreeTextEntry1] : I have recommended followup in one month for reevaluation of ear canal inflammation. The patient may wish to proceed with repeat placement earlens technology, which will likely include a new impression of the ear canal on the right side.

## 2021-12-13 NOTE — HISTORY OF PRESENT ILLNESS
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 There is a history of tinnitus for many years since the 1990s. Previously treated with steroids with uncertain benefit. Intratympanic steroids appeared to have been effective.\par \par MRI 2000; normal MRI 2007: Normal\par CT temporal bone 2017: Normal\par \par RPR serologies in HSP 70 serologies 2017: Negative\par \par Right intratympanic steroid injections: March 2018: April 2018: June 2018: July 2018: October 2018: November 2018: December 2018;\par Treated with Humira, but discontinued in early 2020\par \par July 2018: Right myringotomy with tube insertion\par July 2019: Right patch myringoplasty \par  [FreeTextEntry1] : December 13, 2021:\par Followup for ear canal inflammation. Dissatisfied with the earlens device which is not functioning well. No significant pain or otorrhea reported.

## 2021-12-13 NOTE — PHYSICAL EXAM
[Normal] : temporomandibular joint is normal [FreeTextEntry1] : Given the recurrent inflammation and difficulty with technology and after careful review with the patient and the , the decision was made to remove the earlens device from both ears.\par \par Procedure: Earlens removal, bilateral\par \par Indication: Sensorineural Hearing loss \par \par Management options reviewed in detail.  All risks limitations complications and alternatives reviewed with the patient who agreed.\par \par The ear lens was removed from each ear by gently elevating the lens with a right angled instrument and retrieving the lens with an alligator on the grasping tab.  The TM was intact and non traumatized.\par \par Microscopic ear exam with cerumen debridement:\par \par Right ear: Obstructing cerumen was debrided from the ear canal using suction, and curet.  Mild underlying inflammation noted on the floor of the ear canal medially with purulence and debrided with suction. The tympanic membrane was intact and noninflamed.\par \par Left ear: Obstructing cerumen was debrided from the ear canal using suction, and curets.  The ear canal was otherwise within normal limits.  The tympanic membrane was intact and noninflamed.\par

## 2021-12-16 ENCOUNTER — APPOINTMENT (OUTPATIENT)
Dept: OTOLARYNGOLOGY | Facility: CLINIC | Age: 56
End: 2021-12-16
Payer: SELF-PAY

## 2021-12-16 PROCEDURE — 92593: CPT | Mod: NC

## 2022-01-11 ENCOUNTER — APPOINTMENT (OUTPATIENT)
Dept: OTOLARYNGOLOGY | Facility: CLINIC | Age: 57
End: 2022-01-11
Payer: SELF-PAY

## 2022-01-11 PROCEDURE — 92593: CPT | Mod: NC

## 2022-03-03 ENCOUNTER — APPOINTMENT (OUTPATIENT)
Dept: OTOLARYNGOLOGY | Facility: CLINIC | Age: 57
End: 2022-03-03
Payer: SELF-PAY

## 2022-03-03 PROCEDURE — 92593: CPT | Mod: NC

## 2022-04-19 ENCOUNTER — APPOINTMENT (OUTPATIENT)
Dept: OTOLARYNGOLOGY | Facility: CLINIC | Age: 57
End: 2022-04-19
Payer: COMMERCIAL

## 2022-04-19 PROCEDURE — V5261I: CUSTOM

## 2022-05-10 ENCOUNTER — APPOINTMENT (OUTPATIENT)
Dept: OTOLARYNGOLOGY | Facility: CLINIC | Age: 57
End: 2022-05-10
Payer: COMMERCIAL

## 2022-05-10 VITALS — HEIGHT: 68 IN | BODY MASS INDEX: 26.67 KG/M2 | TEMPERATURE: 96.7 F | WEIGHT: 176 LBS

## 2022-05-10 DIAGNOSIS — H61.20 IMPACTED CERUMEN, UNSPECIFIED EAR: ICD-10-CM

## 2022-05-10 PROCEDURE — 99213 OFFICE O/P EST LOW 20 MIN: CPT | Mod: 25

## 2022-05-10 PROCEDURE — 69210 REMOVE IMPACTED EAR WAX UNI: CPT

## 2022-05-10 PROCEDURE — 92593: CPT | Mod: NC

## 2022-05-10 NOTE — HISTORY OF PRESENT ILLNESS
[de-identified] : MANOLO BUITRAGO is a 56 year man with a history of presbycusis who uses hearing aids.\par

## 2022-05-26 ENCOUNTER — APPOINTMENT (OUTPATIENT)
Dept: OTOLARYNGOLOGY | Facility: CLINIC | Age: 57
End: 2022-05-26
Payer: SELF-PAY

## 2022-05-26 PROCEDURE — 92593: CPT | Mod: NC

## 2022-09-20 ENCOUNTER — APPOINTMENT (OUTPATIENT)
Dept: OTOLARYNGOLOGY | Facility: CLINIC | Age: 57
End: 2022-09-20

## 2022-09-20 PROCEDURE — 92593: CPT | Mod: NC

## 2022-09-22 ENCOUNTER — APPOINTMENT (OUTPATIENT)
Dept: OTOLARYNGOLOGY | Facility: CLINIC | Age: 57
End: 2022-09-22

## 2022-09-22 PROCEDURE — 92593: CPT | Mod: NC

## 2022-10-04 ENCOUNTER — APPOINTMENT (OUTPATIENT)
Dept: OTOLARYNGOLOGY | Facility: CLINIC | Age: 57
End: 2022-10-04

## 2022-10-04 VITALS — BODY MASS INDEX: 26.67 KG/M2 | WEIGHT: 176 LBS | TEMPERATURE: 97.2 F | HEIGHT: 68 IN

## 2022-10-04 DIAGNOSIS — H60.8X3 OTHER OTITIS EXTERNA, BILATERAL: ICD-10-CM

## 2022-10-04 PROCEDURE — 99214 OFFICE O/P EST MOD 30 MIN: CPT

## 2022-10-04 PROCEDURE — 92557 COMPREHENSIVE HEARING TEST: CPT

## 2022-10-04 PROCEDURE — 92504 EAR MICROSCOPY EXAMINATION: CPT

## 2022-10-04 PROCEDURE — 92593: CPT | Mod: NC

## 2022-10-04 PROCEDURE — 92567 TYMPANOMETRY: CPT

## 2022-10-04 NOTE — HISTORY OF PRESENT ILLNESS
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 There is a history of tinnitus for many years since the 1990s. Previously treated with steroids with uncertain benefit. Intratympanic steroids appeared to have been effective.\par \par MRI 2000; normal MRI 2007: Normal\par CT temporal bone 2017: Normal\par \par RPR serologies in HSP 70 serologies 2017: Negative\par \par Right intratympanic steroid injections: March 2018: April 2018: June 2018: July 2018: October 2018: November 2018: December 2018;\par Treated with Humira, but discontinued in early 2020\par \par July 2018: Right myringotomy with tube insertion\par July 2019: Right patch myringoplasty \par  [FreeTextEntry1] : 10/04/2022 \par frequent ear itching using qtip swabs.  Using bilateral amplification.  No fluctuations reported. No longer on immune modulating medication

## 2022-10-04 NOTE — DATA REVIEWED
[de-identified] : In light of the patients current symptoms, Complete audiometry was ordered and completed today. I have interpreted these results and reviewed them in detail with the patient.\par \par Moderate to severe bilateral sensorineural hearing loss with a low normal word recognition

## 2022-10-04 NOTE — PHYSICAL EXAM
[Normal] : temporomandibular joint is normal [FreeTextEntry1] : Procedure: Microscopic Ear Exam\par Mild eczematous changes of the meatal and proximal ear canal skin bilaterally.\par \par Left ear:  Ear canal intact without inflammation or lesion.  \par Tympanic membrane intact without inflammation.\par \par Right ear:  Ear canal intact without inflammation or lesion.  \par Tympanic membrane intact without inflammation.\par

## 2022-10-04 NOTE — ASSESSMENT
[FreeTextEntry1] : Ear hygiene carefully reviewed.  I have recommended discontinuing the use of ear swabs.  I have recommended judicious use of otic drops for controlling itch.  Monitoring recommended.\par \par Follow-up with repeat audiometry in 1 year and as needed.

## 2023-01-31 ENCOUNTER — APPOINTMENT (OUTPATIENT)
Dept: OTOLARYNGOLOGY | Facility: CLINIC | Age: 58
End: 2023-01-31
Payer: SELF-PAY

## 2023-01-31 ENCOUNTER — APPOINTMENT (OUTPATIENT)
Dept: OTOLARYNGOLOGY | Facility: CLINIC | Age: 58
End: 2023-01-31
Payer: COMMERCIAL

## 2023-01-31 VITALS — HEIGHT: 68 IN | WEIGHT: 176 LBS | BODY MASS INDEX: 26.67 KG/M2

## 2023-01-31 PROCEDURE — 99213 OFFICE O/P EST LOW 20 MIN: CPT | Mod: 25

## 2023-01-31 PROCEDURE — 92504 EAR MICROSCOPY EXAMINATION: CPT

## 2023-01-31 PROCEDURE — 92593: CPT | Mod: NC

## 2023-01-31 RX ORDER — NEOMYCIN SULFATE, POLYMYXIN B SULFATE AND HYDROCORTISONE 3.5; 10000; 1 MG/ML; [IU]/ML; MG/ML
3.5-10000-1 SOLUTION AURICULAR (OTIC)
Qty: 1 | Refills: 0 | Status: DISCONTINUED | COMMUNITY
Start: 2020-12-15 | End: 2023-01-31

## 2023-01-31 RX ORDER — FLUOCINOLONE ACETONIDE 0.11 MG/ML
0.01 OIL AURICULAR (OTIC) DAILY
Qty: 1 | Refills: 0 | Status: DISCONTINUED | COMMUNITY
Start: 2021-02-03 | End: 2023-01-31

## 2023-01-31 RX ORDER — MOMETASONE FUROATE 1 MG/ML
0.1 SOLUTION TOPICAL
Qty: 1 | Refills: 0 | Status: DISCONTINUED | COMMUNITY
Start: 2022-10-04 | End: 2023-01-31

## 2023-01-31 RX ORDER — FLUOCINOLONE ACETONIDE 0.11 MG/ML
0.01 OIL AURICULAR (OTIC)
Qty: 1 | Refills: 2 | Status: DISCONTINUED | COMMUNITY
Start: 2021-04-06 | End: 2023-01-31

## 2023-01-31 NOTE — HISTORY OF PRESENT ILLNESS
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 There is a history of tinnitus for many years since the 1990s. Previously treated with steroids with uncertain benefit. Intratympanic steroids appeared to have been effective.\par \par MRI 2000; normal MRI 2007: Normal\par CT temporal bone 2017: Normal\par \par RPR serologies in HSP 70 serologies 2017: Negative\par \par Right intratympanic steroid injections: March 2018: April 2018: June 2018: July 2018: October 2018: November 2018: December 2018;\par Treated with Humira, but discontinued in early 2020\par \par July 2018: Right myringotomy with tube insertion\par July 2019: Right patch myringoplasty \par  [FreeTextEntry1] : 01/31/2023 \par Hearing adequately with hearing aids. No ear itching reported. Possible loss of wax trap from hearing aid in the ear reported.

## 2023-01-31 NOTE — ASSESSMENT
[FreeTextEntry1] : Improved eczema of the ear canals.  Hearing is now stable using bilateral amplification.\par \par Follow-up recommended in 6 months.

## 2023-02-14 ENCOUNTER — APPOINTMENT (OUTPATIENT)
Dept: OTOLARYNGOLOGY | Facility: CLINIC | Age: 58
End: 2023-02-14
Payer: SELF-PAY

## 2023-02-14 PROCEDURE — 92593: CPT | Mod: NC

## 2023-09-01 ENCOUNTER — APPOINTMENT (OUTPATIENT)
Dept: OTOLARYNGOLOGY | Facility: CLINIC | Age: 58
End: 2023-09-01
Payer: SELF-PAY

## 2023-09-01 PROCEDURE — 92593: CPT | Mod: NC

## 2023-10-06 ENCOUNTER — APPOINTMENT (OUTPATIENT)
Dept: OTOLARYNGOLOGY | Facility: CLINIC | Age: 58
End: 2023-10-06
Payer: SELF-PAY

## 2023-10-06 PROCEDURE — V5264E: CUSTOM

## 2024-02-20 ENCOUNTER — APPOINTMENT (OUTPATIENT)
Dept: OTOLARYNGOLOGY | Facility: CLINIC | Age: 59
End: 2024-02-20

## 2024-03-21 ENCOUNTER — APPOINTMENT (OUTPATIENT)
Dept: OTOLARYNGOLOGY | Facility: CLINIC | Age: 59
End: 2024-03-21
Payer: SELF-PAY

## 2024-03-21 PROCEDURE — 92593: CPT | Mod: NC

## 2024-04-03 ENCOUNTER — APPOINTMENT (OUTPATIENT)
Dept: OTOLARYNGOLOGY | Facility: CLINIC | Age: 59
End: 2024-04-03
Payer: COMMERCIAL

## 2024-04-03 ENCOUNTER — APPOINTMENT (OUTPATIENT)
Dept: OTOLARYNGOLOGY | Facility: CLINIC | Age: 59
End: 2024-04-03
Payer: SELF-PAY

## 2024-04-03 DIAGNOSIS — H90.3 SENSORINEURAL HEARING LOSS, BILATERAL: ICD-10-CM

## 2024-04-03 PROCEDURE — 92593: CPT | Mod: NC

## 2024-04-03 PROCEDURE — 92504 EAR MICROSCOPY EXAMINATION: CPT

## 2024-04-03 PROCEDURE — 92567 TYMPANOMETRY: CPT

## 2024-04-03 PROCEDURE — 99213 OFFICE O/P EST LOW 20 MIN: CPT

## 2024-04-03 PROCEDURE — 92557 COMPREHENSIVE HEARING TEST: CPT

## 2024-04-03 RX ORDER — LORAZEPAM 0.5 MG/1
0.5 TABLET ORAL
Refills: 0 | Status: ACTIVE | COMMUNITY

## 2024-04-03 NOTE — ASSESSMENT
[FreeTextEntry1] : Significant bilateral sensorineural hearing loss with possible progression.  I have discussed this with him.  I have recommended monitoring.  I referred him to his audiologist for hearing technology optimization.

## 2024-04-03 NOTE — HISTORY OF PRESENT ILLNESS
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 There is a history of tinnitus for many years since the 1990s. Previously treated with steroids with uncertain benefit. Intratympanic steroids appeared to have been effective.    MRI 2000; normal MRI 2007: Normal  CT temporal bone 2017: Normal    RPR serologies in [facility] serologies 2017: Negative    Right intratympanic steroid injections: March 2018: April 2018: June 2018: July 2018: October 2018: November 2018: December 2018;  Treated with Humira, but discontinued in early 2020 July 2018: Right myringotomy with tube insertion  July 2019: Right patch myringoplasty [FreeTextEntry1] : 04/03/2024 Possible decline in hearing noted in general.  Using bilateral amplification with good reported benefit.  No ear pain reported.  No otorrhea.

## 2024-04-03 NOTE — DATA REVIEWED
[de-identified] : In light of the patients current symptoms, Complete audiometry was ordered and completed today. I have interpreted these results and reviewed them in detail with the patient.  Moderate to severe sensorineural hearing loss bilaterally.  This appears to have slightly progressed since 2022

## 2024-04-19 ENCOUNTER — APPOINTMENT (OUTPATIENT)
Dept: OTOLARYNGOLOGY | Facility: CLINIC | Age: 59
End: 2024-04-19
Payer: SELF-PAY

## 2024-04-19 PROCEDURE — V5261A: CUSTOM

## 2024-04-26 ENCOUNTER — NON-APPOINTMENT (OUTPATIENT)
Age: 59
End: 2024-04-26

## 2024-05-24 ENCOUNTER — APPOINTMENT (OUTPATIENT)
Dept: OTOLARYNGOLOGY | Facility: CLINIC | Age: 59
End: 2024-05-24
Payer: SELF-PAY

## 2024-05-24 PROCEDURE — V5264I: CUSTOM

## 2024-06-05 ENCOUNTER — NON-APPOINTMENT (OUTPATIENT)
Age: 59
End: 2024-06-05

## 2024-06-17 ENCOUNTER — NON-APPOINTMENT (OUTPATIENT)
Age: 59
End: 2024-06-17

## 2024-07-03 ENCOUNTER — APPOINTMENT (OUTPATIENT)
Dept: OTOLARYNGOLOGY | Facility: CLINIC | Age: 59
End: 2024-07-03
Payer: SELF-PAY

## 2024-07-03 PROCEDURE — 92593: CPT | Mod: NC

## 2024-07-03 PROCEDURE — V5264I: CUSTOM

## 2024-07-03 PROCEDURE — V5261K: CUSTOM | Mod: 25

## 2024-09-11 ENCOUNTER — NON-APPOINTMENT (OUTPATIENT)
Age: 59
End: 2024-09-11

## 2024-10-24 ENCOUNTER — APPOINTMENT (OUTPATIENT)
Dept: OTOLARYNGOLOGY | Facility: CLINIC | Age: 59
End: 2024-10-24
Payer: SELF-PAY

## 2024-10-24 PROCEDURE — 92593: CPT | Mod: NC

## 2024-11-20 ENCOUNTER — APPOINTMENT (OUTPATIENT)
Dept: OTOLARYNGOLOGY | Facility: CLINIC | Age: 59
End: 2024-11-20
Payer: SELF-PAY

## 2024-11-20 PROCEDURE — 92593: CPT | Mod: NC

## 2024-12-19 ENCOUNTER — APPOINTMENT (OUTPATIENT)
Dept: OTOLARYNGOLOGY | Facility: CLINIC | Age: 59
End: 2024-12-19
Payer: SELF-PAY

## 2024-12-19 PROCEDURE — 92593: CPT | Mod: NC

## 2024-12-20 ENCOUNTER — NON-APPOINTMENT (OUTPATIENT)
Age: 59
End: 2024-12-20

## 2024-12-20 ENCOUNTER — APPOINTMENT (OUTPATIENT)
Dept: OTOLARYNGOLOGY | Facility: CLINIC | Age: 59
End: 2024-12-20
Payer: COMMERCIAL

## 2024-12-20 DIAGNOSIS — H60.312 DIFFUSE OTITIS EXTERNA, LEFT EAR: ICD-10-CM

## 2024-12-20 DIAGNOSIS — H90.3 SENSORINEURAL HEARING LOSS, BILATERAL: ICD-10-CM

## 2024-12-20 PROCEDURE — 99214 OFFICE O/P EST MOD 30 MIN: CPT

## 2024-12-20 RX ORDER — CIPROFLOXACIN AND DEXAMETHASONE 3; 1 MG/ML; MG/ML
0.3-0.1 SUSPENSION/ DROPS AURICULAR (OTIC) TWICE DAILY
Qty: 1 | Refills: 1 | Status: ACTIVE | COMMUNITY
Start: 2024-12-20 | End: 1900-01-01

## 2024-12-24 ENCOUNTER — APPOINTMENT (OUTPATIENT)
Dept: OTOLARYNGOLOGY | Facility: CLINIC | Age: 59
End: 2024-12-24
Payer: COMMERCIAL

## 2024-12-24 DIAGNOSIS — H60.8X3 OTHER OTITIS EXTERNA, BILATERAL: ICD-10-CM

## 2024-12-24 PROCEDURE — 99212 OFFICE O/P EST SF 10 MIN: CPT

## 2025-05-23 ENCOUNTER — NON-APPOINTMENT (OUTPATIENT)
Age: 60
End: 2025-05-23

## 2025-06-05 ENCOUNTER — APPOINTMENT (OUTPATIENT)
Dept: OTOLARYNGOLOGY | Facility: CLINIC | Age: 60
End: 2025-06-05

## 2025-06-05 ENCOUNTER — APPOINTMENT (OUTPATIENT)
Dept: OTOLARYNGOLOGY | Facility: CLINIC | Age: 60
End: 2025-06-05
Payer: COMMERCIAL

## 2025-06-05 DIAGNOSIS — H90.3 SENSORINEURAL HEARING LOSS, BILATERAL: ICD-10-CM

## 2025-06-05 PROBLEM — H60.00 FURUNCLE OF EAR: Status: ACTIVE | Noted: 2025-06-05

## 2025-06-05 PROCEDURE — 69005 DRG XTRNL EAR ABSC/HEM COMP: CPT | Mod: LT

## 2025-06-05 PROCEDURE — 92593: CPT | Mod: NC

## 2025-06-05 PROCEDURE — 99213 OFFICE O/P EST LOW 20 MIN: CPT | Mod: 25

## 2025-06-05 RX ORDER — CLINDAMYCIN HYDROCHLORIDE 300 MG/1
300 CAPSULE ORAL 3 TIMES DAILY
Qty: 30 | Refills: 0 | Status: ACTIVE | COMMUNITY
Start: 2025-06-05 | End: 1900-01-01

## 2025-06-09 ENCOUNTER — APPOINTMENT (OUTPATIENT)
Dept: OTOLARYNGOLOGY | Facility: CLINIC | Age: 60
End: 2025-06-09
Payer: COMMERCIAL

## 2025-06-09 PROCEDURE — 99213 OFFICE O/P EST LOW 20 MIN: CPT | Mod: 25

## 2025-06-19 ENCOUNTER — APPOINTMENT (OUTPATIENT)
Dept: OTOLARYNGOLOGY | Facility: CLINIC | Age: 60
End: 2025-06-19
Payer: COMMERCIAL

## 2025-06-19 PROCEDURE — 99213 OFFICE O/P EST LOW 20 MIN: CPT

## 2025-06-19 RX ORDER — MUPIROCIN 20 MG/G
2 OINTMENT TOPICAL 3 TIMES DAILY
Qty: 1 | Refills: 2 | Status: ACTIVE | COMMUNITY
Start: 2025-06-19 | End: 1900-01-01

## 2025-06-26 ENCOUNTER — APPOINTMENT (OUTPATIENT)
Dept: OTOLARYNGOLOGY | Facility: CLINIC | Age: 60
End: 2025-06-26

## 2025-06-26 LAB — EAR NOSE AND THROAT CULTURE: ABNORMAL

## 2025-07-08 ENCOUNTER — APPOINTMENT (OUTPATIENT)
Dept: OTOLARYNGOLOGY | Facility: CLINIC | Age: 60
End: 2025-07-08
Payer: SELF-PAY

## 2025-07-08 ENCOUNTER — APPOINTMENT (OUTPATIENT)
Dept: OTOLARYNGOLOGY | Facility: CLINIC | Age: 60
End: 2025-07-08

## 2025-07-08 PROCEDURE — 92593: CPT | Mod: NC

## 2025-07-14 ENCOUNTER — APPOINTMENT (OUTPATIENT)
Dept: OTOLARYNGOLOGY | Facility: CLINIC | Age: 60
End: 2025-07-14
Payer: COMMERCIAL

## 2025-07-14 PROCEDURE — 99213 OFFICE O/P EST LOW 20 MIN: CPT | Mod: 95

## 2025-07-14 RX ORDER — PREDNISONE 10 MG/1
10 TABLET ORAL
Qty: 30 | Refills: 0 | Status: ACTIVE | COMMUNITY
Start: 2025-07-14 | End: 1900-01-01

## 2025-07-30 ENCOUNTER — APPOINTMENT (OUTPATIENT)
Dept: OTOLARYNGOLOGY | Facility: CLINIC | Age: 60
End: 2025-07-30
Payer: COMMERCIAL

## 2025-07-30 ENCOUNTER — APPOINTMENT (OUTPATIENT)
Dept: OTOLARYNGOLOGY | Facility: CLINIC | Age: 60
End: 2025-07-30
Payer: SELF-PAY

## 2025-07-30 ENCOUNTER — NON-APPOINTMENT (OUTPATIENT)
Age: 60
End: 2025-07-30

## 2025-07-30 VITALS — BODY MASS INDEX: 22.73 KG/M2 | WEIGHT: 150 LBS | HEIGHT: 68 IN

## 2025-07-30 DIAGNOSIS — H90.3 SENSORINEURAL HEARING LOSS, BILATERAL: ICD-10-CM

## 2025-07-30 DIAGNOSIS — H60.8X3 OTHER OTITIS EXTERNA, BILATERAL: ICD-10-CM

## 2025-07-30 DIAGNOSIS — H83.8X3 OTHER SPECIFIED DISEASES OF INNER EAR, BILATERAL: ICD-10-CM

## 2025-07-30 PROCEDURE — 99214 OFFICE O/P EST MOD 30 MIN: CPT | Mod: 25

## 2025-07-30 PROCEDURE — 92593: CPT | Mod: NC

## 2025-07-30 PROCEDURE — 92557 COMPREHENSIVE HEARING TEST: CPT

## 2025-09-02 ENCOUNTER — APPOINTMENT (OUTPATIENT)
Dept: OTOLARYNGOLOGY | Facility: CLINIC | Age: 60
End: 2025-09-02
Payer: COMMERCIAL

## 2025-09-02 DIAGNOSIS — H61.20 IMPACTED CERUMEN, UNSPECIFIED EAR: ICD-10-CM

## 2025-09-02 PROCEDURE — 99214 OFFICE O/P EST MOD 30 MIN: CPT | Mod: 25

## 2025-09-10 LAB — EAR NOSE AND THROAT CULTURE: ABNORMAL

## 2025-09-11 ENCOUNTER — APPOINTMENT (OUTPATIENT)
Dept: OTOLARYNGOLOGY | Facility: CLINIC | Age: 60
End: 2025-09-11
Payer: COMMERCIAL

## 2025-09-11 DIAGNOSIS — H90.3 SENSORINEURAL HEARING LOSS, BILATERAL: ICD-10-CM

## 2025-09-11 DIAGNOSIS — H83.8X3 OTHER SPECIFIED DISEASES OF INNER EAR, BILATERAL: ICD-10-CM

## 2025-09-11 DIAGNOSIS — H60.312 DIFFUSE OTITIS EXTERNA, LEFT EAR: ICD-10-CM

## 2025-09-11 PROCEDURE — 99214 OFFICE O/P EST MOD 30 MIN: CPT

## 2025-09-11 PROCEDURE — 92504 EAR MICROSCOPY EXAMINATION: CPT
